# Patient Record
Sex: FEMALE | Race: WHITE | NOT HISPANIC OR LATINO | URBAN - NONMETROPOLITAN AREA
[De-identification: names, ages, dates, MRNs, and addresses within clinical notes are randomized per-mention and may not be internally consistent; named-entity substitution may affect disease eponyms.]

---

## 2019-02-11 ENCOUNTER — IMPORTED ENCOUNTER (OUTPATIENT)
Dept: URBAN - NONMETROPOLITAN AREA CLINIC 1 | Facility: CLINIC | Age: 39
End: 2019-02-11

## 2019-02-11 PROBLEM — H10.012: Noted: 2019-02-11

## 2019-02-11 PROCEDURE — 99212 OFFICE O/P EST SF 10 MIN: CPT

## 2020-08-04 ENCOUNTER — IMPORTED ENCOUNTER (OUTPATIENT)
Dept: URBAN - NONMETROPOLITAN AREA CLINIC 1 | Facility: CLINIC | Age: 40
End: 2020-08-04

## 2020-08-04 PROBLEM — H52.13: Noted: 2020-08-04

## 2020-08-04 PROCEDURE — 92310 CONTACT LENS FITTING OU: CPT

## 2020-08-04 PROCEDURE — 92014 COMPRE OPH EXAM EST PT 1/>: CPT

## 2020-08-04 PROCEDURE — 92015 DETERMINE REFRACTIVE STATE: CPT

## 2022-04-15 ASSESSMENT — VISUAL ACUITY
OD_SC: 20/20
OS_SC: 20/20
OS_SC: 20/20
OD_SC: 20/20

## 2022-04-15 ASSESSMENT — TONOMETRY
OD_IOP_MMHG: 17
OS_IOP_MMHG: 17

## 2022-07-08 ENCOUNTER — ESTABLISHED PATIENT (OUTPATIENT)
Dept: RURAL CLINIC 1 | Facility: CLINIC | Age: 42
End: 2022-07-08

## 2022-07-08 DIAGNOSIS — H52.13: ICD-10-CM

## 2022-07-08 PROCEDURE — 92015 DETERMINE REFRACTIVE STATE: CPT

## 2022-07-08 PROCEDURE — 92014 COMPRE OPH EXAM EST PT 1/>: CPT

## 2022-07-08 PROCEDURE — 92310-E CONTACT LENS FITTING ESTABLISH PATIENT

## 2022-07-08 ASSESSMENT — VISUAL ACUITY
OU_CC: 20/20
OU_CC: 20/20
OD_CC: 20/20
OS_CC: 20/20 BLURRY

## 2023-10-10 ENCOUNTER — ESTABLISHED PATIENT (OUTPATIENT)
Dept: RURAL CLINIC 1 | Facility: CLINIC | Age: 43
End: 2023-10-10

## 2023-10-10 DIAGNOSIS — H52.13: ICD-10-CM

## 2023-10-10 PROCEDURE — 92015 DETERMINE REFRACTIVE STATE: CPT

## 2023-10-10 PROCEDURE — 92014 COMPRE OPH EXAM EST PT 1/>: CPT

## 2023-10-10 PROCEDURE — 92310-E CONTACT LENS FITTING ESTABLISH PATIENT

## 2023-10-10 ASSESSMENT — TONOMETRY
OS_IOP_MMHG: 16
OD_IOP_MMHG: 16

## 2023-10-10 ASSESSMENT — VISUAL ACUITY
OU_CC: 20/20
OU_CC: 20/20
OS_CC: 20/20
OD_CC: 20/20
OS_CC: 20/20
OD_CC: 20/20

## 2024-12-12 ENCOUNTER — COMPREHENSIVE EXAM (OUTPATIENT)
Age: 44
End: 2024-12-12

## 2024-12-12 DIAGNOSIS — H52.13: ICD-10-CM

## 2024-12-12 PROCEDURE — 92014 COMPRE OPH EXAM EST PT 1/>: CPT

## 2024-12-12 PROCEDURE — 92015 DETERMINE REFRACTIVE STATE: CPT

## 2024-12-12 PROCEDURE — 92310-1 LEVEL 1 SOFT LENS UPDATE
